# Patient Record
Sex: FEMALE | Race: OTHER | NOT HISPANIC OR LATINO | URBAN - METROPOLITAN AREA
[De-identification: names, ages, dates, MRNs, and addresses within clinical notes are randomized per-mention and may not be internally consistent; named-entity substitution may affect disease eponyms.]

---
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
## 2021-09-08 NOTE — ED BEHAVIORAL HEALTH ASSESSMENT NOTE - FAMILY DETAILS
Infectious Disease Progress Note         Interval:  NAEO. Subjective:   Patient seen today in follow-up.  services were used. Patient reports feeling well. Reports acid reflex. No pain in the skin. No f/c. Objective:    Vitals:   Reviewed in chart. Physical Exam:  Gen: No apparent distress  HEENT:  Normocephalic, atraumatic, no scleral icterus, no oral or mucosal lesions, ulcerations or peeling. CV: off pressors   Lungs: Room air  Abdomen: soft, non tender, non distended  Genitourinary:    goldman catheter   Skin: Rash is almost resolved everywhere. There is desquamation on the face, upper back, neck and upper torso.    Psych: good affect, good eye contact, non tearful  Neuro: alert, oriented to time,  place, and situation, moves all extremities to commands, verbal  Musculoskeletal:  No joint edema, erythema or tenderness noted           Labs:  Recent Results (from the past 24 hour(s))   MAGNESIUM    Collection Time: 09/08/21  3:51 AM   Result Value Ref Range    Magnesium 2.2 1.6 - 2.4 mg/dL   PHOSPHORUS    Collection Time: 09/08/21  3:51 AM   Result Value Ref Range    Phosphorus 3.0 2.6 - 4.7 MG/DL   CBC W/O DIFF    Collection Time: 09/08/21  3:51 AM   Result Value Ref Range    WBC 4.3 3.6 - 11.0 K/uL    RBC 3.17 (L) 3.80 - 5.20 M/uL    HGB 8.4 (L) 11.5 - 16.0 g/dL    HCT 26.8 (L) 35.0 - 47.0 %    MCV 84.5 80.0 - 99.0 FL    MCH 26.5 26.0 - 34.0 PG    MCHC 31.3 30.0 - 36.5 g/dL    RDW 16.8 (H) 11.5 - 14.5 %    PLATELET 064 087 - 956 K/uL    MPV 10.8 8.9 - 12.9 FL    NRBC 0.5 (H) 0  WBC    ABSOLUTE NRBC 0.02 (H) 0.00 - 0.01 K/uL   VANCOMYCIN, RANDOM    Collection Time: 09/08/21  3:51 AM   Result Value Ref Range    Vancomycin, random 4.7 UG/ML   LD    Collection Time: 09/08/21  3:51 AM   Result Value Ref Range     (H) 81 - 233 U/L   METABOLIC PANEL, COMPREHENSIVE    Collection Time: 09/08/21  3:51 AM   Result Value Ref Range    Sodium 141 136 - 145 mmol/L    Potassium 3.3 (L) 3.5 - 5.1 mmol/L    Chloride 106 97 - 108 mmol/L    CO2 29 21 - 32 mmol/L    Anion gap 6 5 - 15 mmol/L    Glucose 94 65 - 100 mg/dL    BUN 6 6 - 20 MG/DL    Creatinine 0.52 (L) 0.55 - 1.02 MG/DL    BUN/Creatinine ratio 12 12 - 20      GFR est AA >60 >60 ml/min/1.73m2    GFR est non-AA >60 >60 ml/min/1.73m2    Calcium 7.9 (L) 8.5 - 10.1 MG/DL    Bilirubin, total 0.4 0.2 - 1.0 MG/DL    ALT (SGPT) 201 (H) 12 - 78 U/L    AST (SGOT) 184 (H) 15 - 37 U/L    Alk. phosphatase 52 45 - 117 U/L    Protein, total 5.9 (L) 6.4 - 8.2 g/dL    Albumin 2.0 (L) 3.5 - 5.0 g/dL    Globulin 3.9 2.0 - 4.0 g/dL    A-G Ratio 0.5 (L) 1.1 - 2.2     LACTIC ACID    Collection Time: 09/08/21  4:36 AM   Result Value Ref Range    Lactic acid 1.0 0.4 - 2.0 MMOL/L               Assessment:  - Severe drug reaction with rash, systemic malaise. Per history this appears to be to be likely due to clindamycin. History was again confirmed with the patient on 9/8/21. Not assessed to be anaphylaxis given patient tolerated multiple doses of clindamycin before she presented to the ED. Hypotension on arrival likely due to the inflammatory response of the drug rash and dehydration. Per the history patient gave to us (confirmed multiple times with patient with ) she did not have a rash to either Bactrim or Cephalexin. 9/8/21: Rash is almost resolved everywhere. There is desquamation on the face, upper back, neck and upper torso. Assessed to be part of the drug reaction and skin healing. HIV negative. Recommendations:   Continue to monitor off antimicrobials at this point. We will see in clinic on 9/15/21 in follow up. Wound care is seeing patient for skin care; appreciate input. Diligent skin care will be needed as outpatient. Will sign off now, please recall as needed. Thank you for the opportunity to participate in the care of this patient. Please contact with questions or concerns.       Nola David, MD  Infectious Diseases with parents and older sister (25 yo)

## 2024-02-02 ENCOUNTER — APPOINTMENT (OUTPATIENT)
Dept: DERMATOLOGY | Facility: CLINIC | Age: 24
End: 2024-02-02
Payer: COMMERCIAL

## 2024-02-02 DIAGNOSIS — L72.9 FOLLICULAR CYST OF THE SKIN AND SUBCUTANEOUS TISSUE, UNSPECIFIED: ICD-10-CM

## 2024-02-02 DIAGNOSIS — L24.9 IRRITANT CONTACT DERMATITIS, UNSPECIFIED CAUSE: ICD-10-CM

## 2024-02-02 DIAGNOSIS — D48.9 NEOPLASM OF UNCERTAIN BEHAVIOR, UNSPECIFIED: ICD-10-CM

## 2024-02-02 PROCEDURE — 99203 OFFICE O/P NEW LOW 30 MIN: CPT

## 2024-02-09 NOTE — PHYSICAL EXAM
[Alert] : alert [Oriented x 3] : ~L oriented x 3 [Well Nourished] : well nourished [Conjunctiva Non-injected] : conjunctiva non-injected [No Visual Lymphadenopathy] : no visual  lymphadenopathy [No Clubbing] : no clubbing [No Edema] : no edema [No Bromhidrosis] : no bromhidrosis [No Chromhidrosis] : no chromhidrosis [FreeTextEntry3] : L axilla w 3mm hyperpigmented papule with punctum R axilla with smooth hyperpigmented papule no lesions seen on groin or vulva

## 2024-02-09 NOTE — HISTORY OF PRESENT ILLNESS
[FreeTextEntry1] : npa - bumps in axilla [de-identified] : Pt is a 23 year old F presenting for initial eval of:  1. Bumps in axilla  - pt gets inflamed bumps in axilla every 2-3 months. they do not drain. has not noted any patterns with flares (shaving, menstruation). had a similar bump in groin, was told it is HS by her Gynecologist.  - axilla has been itchy, has not changes any of the products that she's using, has been exercising more frequently.

## 2024-02-09 NOTE — ASSESSMENT
[FreeTextEntry1] : # Nodule in L axilla - favor ruptured follicular cyst - education and counseling - no concerns for HS at this time, lesions do not have appropriate morphology; advised pt to keep a log of flares - discussed punch biopsy to remove nodule, for relief and diagnostic purposes.  - pt agrees with punch bx but will schedule it for another time.   # Irritant Contact Dermatitis possibly to sweat in axilla - education and counseling - can try HC 1% OTC oint BID 1-2 weeks  RTC PRN

## 2024-04-05 ENCOUNTER — APPOINTMENT (OUTPATIENT)
Dept: DERMATOLOGY | Facility: CLINIC | Age: 24
End: 2024-04-05

## 2025-04-07 ENCOUNTER — APPOINTMENT (OUTPATIENT)
Dept: PSYCHIATRY | Facility: CLINIC | Age: 25
End: 2025-04-07

## 2025-04-22 ENCOUNTER — APPOINTMENT (OUTPATIENT)
Dept: OPHTHALMOLOGY | Facility: CLINIC | Age: 25
End: 2025-04-22
Payer: COMMERCIAL

## 2025-04-22 ENCOUNTER — NON-APPOINTMENT (OUTPATIENT)
Age: 25
End: 2025-04-22

## 2025-04-22 PROCEDURE — 92250 FUNDUS PHOTOGRAPHY W/I&R: CPT

## 2025-04-22 PROCEDURE — 92004 COMPRE OPH EXAM NEW PT 1/>: CPT

## 2025-05-05 ENCOUNTER — NON-APPOINTMENT (OUTPATIENT)
Age: 25
End: 2025-05-05

## 2025-05-05 ENCOUNTER — APPOINTMENT (OUTPATIENT)
Dept: OPHTHALMOLOGY | Facility: CLINIC | Age: 25
End: 2025-05-05
Payer: COMMERCIAL

## 2025-05-05 PROCEDURE — 92012 INTRM OPH EXAM EST PATIENT: CPT

## 2025-05-05 PROCEDURE — 92083 EXTENDED VISUAL FIELD XM: CPT

## 2025-05-05 PROCEDURE — 76514 ECHO EXAM OF EYE THICKNESS: CPT

## 2025-05-05 PROCEDURE — 92133 CPTRZD OPH DX IMG PST SGM ON: CPT

## 2025-05-12 ENCOUNTER — APPOINTMENT (OUTPATIENT)
Dept: PSYCHIATRY | Facility: CLINIC | Age: 25
End: 2025-05-12

## 2025-05-27 ENCOUNTER — APPOINTMENT (OUTPATIENT)
Dept: DERMATOLOGY | Facility: CLINIC | Age: 25
End: 2025-05-27

## 2025-06-13 ENCOUNTER — APPOINTMENT (OUTPATIENT)
Dept: PSYCHIATRY | Facility: CLINIC | Age: 25
End: 2025-06-13